# Patient Record
Sex: MALE | Race: WHITE | NOT HISPANIC OR LATINO | Employment: OTHER | URBAN - METROPOLITAN AREA
[De-identification: names, ages, dates, MRNs, and addresses within clinical notes are randomized per-mention and may not be internally consistent; named-entity substitution may affect disease eponyms.]

---

## 2017-09-20 ENCOUNTER — CLINICAL SUPPORT (OUTPATIENT)
Dept: OCCUPATIONAL MEDICINE | Facility: CLINIC | Age: 62
End: 2017-09-20

## 2017-09-20 VITALS
SYSTOLIC BLOOD PRESSURE: 131 MMHG | WEIGHT: 190 LBS | DIASTOLIC BLOOD PRESSURE: 74 MMHG | TEMPERATURE: 98 F | BODY MASS INDEX: 24.38 KG/M2 | HEIGHT: 74 IN | HEART RATE: 95 BPM | OXYGEN SATURATION: 100 %

## 2017-09-20 DIAGNOSIS — Z02.1 PRE-EMPLOYMENT EXAMINATION: Primary | ICD-10-CM

## 2017-09-20 DIAGNOSIS — Z02.89 ENCOUNTER FOR PHYSICAL EXAMINATION RELATED TO EMPLOYMENT: ICD-10-CM

## 2017-09-20 PROCEDURE — 92283 EXTND COLOR VISION XM: CPT | Mod: S$GLB,,, | Performed by: FAMILY MEDICINE

## 2017-09-20 PROCEDURE — 86580 TB INTRADERMAL TEST: CPT | Mod: S$GLB,,,

## 2017-09-20 PROCEDURE — 99173 VISUAL ACUITY SCREEN: CPT | Mod: S$GLB,,, | Performed by: FAMILY MEDICINE

## 2017-09-20 PROCEDURE — 36415 COLL VENOUS BLD VENIPUNCTURE: CPT | Mod: S$GLB,,, | Performed by: FAMILY MEDICINE

## 2017-09-20 PROCEDURE — 99000 SPECIMEN HANDLING OFFICE-LAB: CPT | Mod: S$GLB,,, | Performed by: FAMILY MEDICINE

## 2017-09-20 PROCEDURE — 99499 UNLISTED E&M SERVICE: CPT | Mod: S$GLB,,, | Performed by: FAMILY MEDICINE

## 2017-10-04 ENCOUNTER — CLINICAL SUPPORT (OUTPATIENT)
Dept: OCCUPATIONAL MEDICINE | Facility: CLINIC | Age: 62
End: 2017-10-04

## 2017-10-04 DIAGNOSIS — Z01.10 ENCOUNTER FOR HEARING TEST: Primary | ICD-10-CM

## 2017-10-04 DIAGNOSIS — Z02.83 ENCOUNTER FOR EMPLOYMENT-RELATED DRUG TESTING: ICD-10-CM

## 2017-10-04 PROCEDURE — 92552 PURE TONE AUDIOMETRY AIR: CPT | Mod: S$GLB,,, | Performed by: FAMILY MEDICINE

## 2018-10-31 ENCOUNTER — OFFICE VISIT (OUTPATIENT)
Dept: URGENT CARE | Facility: CLINIC | Age: 63
End: 2018-10-31
Payer: COMMERCIAL

## 2018-10-31 VITALS
DIASTOLIC BLOOD PRESSURE: 88 MMHG | HEART RATE: 108 BPM | WEIGHT: 190 LBS | SYSTOLIC BLOOD PRESSURE: 139 MMHG | TEMPERATURE: 99 F | BODY MASS INDEX: 24.38 KG/M2 | HEIGHT: 74 IN | OXYGEN SATURATION: 97 % | RESPIRATION RATE: 16 BRPM

## 2018-10-31 DIAGNOSIS — S39.840A PENILE FRACTURE, INITIAL ENCOUNTER: Primary | ICD-10-CM

## 2018-10-31 PROCEDURE — 99203 OFFICE O/P NEW LOW 30 MIN: CPT | Mod: S$GLB,,, | Performed by: FAMILY MEDICINE

## 2018-10-31 PROCEDURE — 3008F BODY MASS INDEX DOCD: CPT | Mod: CPTII,S$GLB,, | Performed by: FAMILY MEDICINE

## 2018-10-31 NOTE — PROGRESS NOTES
"Subjective:       Patient ID: Oc Pierce is a 63 y.o. male.    Vitals:  height is 6' 2" (1.88 m) and weight is 86.2 kg (190 lb). His temperature is 98.7 °F (37.1 °C). His blood pressure is 139/88 and his pulse is 108. His respiration is 16 and oxygen saturation is 97%.     Chief Complaint: Groin Swelling (pt said he had abnormal swelling in his penis during sex)    Pt said the swelling is still present and that there is burning sensation on the outside. He denied having any discharge.      Penis Pain   The patient's primary symptoms include penile pain. This is a new problem. The current episode started today. The problem occurs constantly. The problem has been unchanged. The pain is mild. Pertinent negatives include no chills, dysuria, fever, nausea, rash, urgency or vomiting. He has tried nothing for the symptoms. He is sexually active.   Pt reports purplish discoloration of distal penile shaft after sexual intercourse this AM.  The area is quite swollen, but not indurated.  Pt states this happened once before 5-6 months ago.  He did not seek medical attention at that time.   Pt does not remember a specific injury assoc. W/ sexual intercourse this AM  Review of Systems   Constitution: Negative for chills and fever.   Eyes: Negative for discharge.   Skin: Negative for flushing and rash.   Musculoskeletal: Negative for back pain.   Gastrointestinal: Negative for nausea and vomiting.   Genitourinary: Positive for penile pain. Negative for dysuria, genital sores, hematuria and urgency.   Psychiatric/Behavioral: The patient is nervous/anxious.        Objective:      Physical Exam   Constitutional: He is oriented to person, place, and time. He appears well-developed and well-nourished. He is cooperative.  Non-toxic appearance. He does not appear ill. No distress.   HENT:   Head: Normocephalic and atraumatic.   Right Ear: Hearing, tympanic membrane, external ear and ear canal normal.   Left Ear: Hearing, tympanic " membrane, external ear and ear canal normal.   Nose: Nose normal. No mucosal edema, rhinorrhea or nasal deformity. No epistaxis. Right sinus exhibits no maxillary sinus tenderness and no frontal sinus tenderness. Left sinus exhibits no maxillary sinus tenderness and no frontal sinus tenderness.   Mouth/Throat: Uvula is midline, oropharynx is clear and moist and mucous membranes are normal. No trismus in the jaw. Normal dentition. No uvula swelling. No posterior oropharyngeal erythema.   Eyes: Conjunctivae and lids are normal. Right eye exhibits no discharge. Left eye exhibits no discharge. No scleral icterus.   Sclera clear bilat   Neck: Trachea normal, normal range of motion, full passive range of motion without pain and phonation normal. Neck supple.   Cardiovascular: Normal rate, regular rhythm, normal heart sounds, intact distal pulses and normal pulses.   Pulmonary/Chest: Effort normal and breath sounds normal. No respiratory distress.   Abdominal: Soft. Normal appearance and bowel sounds are normal. He exhibits no distension, no pulsatile midline mass and no mass. There is no tenderness.   Genitourinary:   Genitourinary Comments: Edematous distal 1/3rd of penis sparing glans.  Penis is deviated to the right.  Large hematoma of corpus.  Mildly tender.    No drainage, induration.  Pt reports urination is normal---no blood and no discomfort or difficulty urinating.     Musculoskeletal: Normal range of motion. He exhibits no edema or deformity.   Neurological: He is alert and oriented to person, place, and time. He exhibits normal muscle tone. Coordination normal.   Skin: Skin is warm, dry and intact. He is not diaphoretic. No pallor.   Psychiatric: He has a normal mood and affect. His speech is normal and behavior is normal. Judgment and thought content normal. Cognition and memory are normal.   Nursing note and vitals reviewed.      Assessment:       1. Penile fracture, initial encounter        Plan:          Penile fracture, initial encounter  -     Ambulatory referral to Urology    Pt appears to have an acute exacerbation of a prior penile fracture.  I have started the process for an urgent Urology referral.    Apparently, when our scheduling dept spoke w/ the pt later this evening, he declined our referral and explained he planned to f/u w/ Dr. Braun promptly and would arrange this appt himself.

## 2018-10-31 NOTE — PATIENT INSTRUCTIONS
You have bleeding from the spongy portion of your penis.      This may represent a penile fracture.  Since you have had this before, I am putting in an urgent referral to Dr Braun tomorrow for you to follow-up to discuss long and short term management options.

## 2022-03-03 ENCOUNTER — OFFICE VISIT (OUTPATIENT)
Dept: URGENT CARE | Facility: CLINIC | Age: 67
End: 2022-03-03
Payer: MEDICARE

## 2022-03-03 VITALS
DIASTOLIC BLOOD PRESSURE: 101 MMHG | HEIGHT: 74 IN | WEIGHT: 190 LBS | BODY MASS INDEX: 24.38 KG/M2 | OXYGEN SATURATION: 99 % | RESPIRATION RATE: 16 BRPM | TEMPERATURE: 98 F | SYSTOLIC BLOOD PRESSURE: 164 MMHG | HEART RATE: 64 BPM

## 2022-03-03 DIAGNOSIS — S01.01XA LACERATION OF SCALP, INITIAL ENCOUNTER: Primary | ICD-10-CM

## 2022-03-03 DIAGNOSIS — R03.0 ELEVATED BLOOD PRESSURE READING: ICD-10-CM

## 2022-03-03 PROCEDURE — 90471 IMMUNIZATION ADMIN: CPT | Mod: S$GLB,,, | Performed by: FAMILY MEDICINE

## 2022-03-03 PROCEDURE — 99213 OFFICE O/P EST LOW 20 MIN: CPT | Mod: 25,S$GLB,, | Performed by: PHYSICIAN ASSISTANT

## 2022-03-03 PROCEDURE — 90715 TDAP VACCINE 7 YRS/> IM: CPT | Mod: S$GLB,,, | Performed by: FAMILY MEDICINE

## 2022-03-03 PROCEDURE — 90471 TDAP VACCINE GREATER THAN OR EQUAL TO 7YO IM: ICD-10-PCS | Mod: S$GLB,,, | Performed by: FAMILY MEDICINE

## 2022-03-03 PROCEDURE — 90715 TDAP VACCINE GREATER THAN OR EQUAL TO 7YO IM: ICD-10-PCS | Mod: S$GLB,,, | Performed by: FAMILY MEDICINE

## 2022-03-03 PROCEDURE — 12001 LACERATION REPAIR: ICD-10-PCS | Mod: S$GLB,,, | Performed by: PHYSICIAN ASSISTANT

## 2022-03-03 PROCEDURE — 12001 RPR S/N/AX/GEN/TRNK 2.5CM/<: CPT | Mod: S$GLB,,, | Performed by: PHYSICIAN ASSISTANT

## 2022-03-03 PROCEDURE — 99213 PR OFFICE/OUTPT VISIT, EST, LEVL III, 20-29 MIN: ICD-10-PCS | Mod: 25,S$GLB,, | Performed by: PHYSICIAN ASSISTANT

## 2022-03-03 RX ORDER — MUPIROCIN 20 MG/G
OINTMENT TOPICAL
Qty: 30 G | Refills: 0 | Status: SHIPPED | OUTPATIENT
Start: 2022-03-03

## 2022-03-03 NOTE — PATIENT INSTRUCTIONS
Follow up with your primary care if symptoms do not improve, or you may return here at any time.  If you were referred to a specialist, please follow up with that specialty.  If you were prescribed antibiotics, please take them to completion.  If you were prescribed a narcotic or any medication with sedative effects, do not drive or operate heavy equipment or machinery while taking these medications.  You must understand that you have received treatment at an Urgent Care facility only, and that you may be released before all of your medical problems are known or treated. Urgent Care facilities are not equipped to handle life threatening emergencies. It is recommended that you seek care at an Emergency Department for further evaluation of worsening or concerning symptoms, or possibly life threatening conditions as discussed.                                        If you  smoke, please stop smoking        Elevated Blood Pressure  Your blood pressure was elevated during your visit to the urgent care. It is recommended that you monitor your blood pressure over the next week or two to make sure that it is not consistently elevated.  Please have your blood pressure taken 2-3 times daily at different times of the day.  Write all of those blood pressures down and record the time that they were taken.  Keep all that information and take it with you to see your Primary Care Physician.  If your blood pressure is consistently above 140/90 you will need to follow up with your PCP more quickly. If you develop chest pain, severe headache, extremity weakness, trouble speaking, facial droop, or visual disturbance seek care in an Emergency Room immediately.

## 2022-03-03 NOTE — PROCEDURES
Laceration Repair    Date/Time: 3/3/2022 5:15 PM  Performed by: Apolinar Card PA-C  Authorized by: Apolinar Card PA-C   Consent Done: Yes  Consent: Verbal consent obtained.  Risks and benefits: risks, benefits and alternatives were discussed  Consent given by: patient  Patient understanding: patient states understanding of the procedure being performed  Patient consent: the patient's understanding of the procedure matches consent given  Procedure consent: procedure consent matches procedure scheduled  Relevant documents: relevant documents present and verified  Patient identity confirmed: , MRN, name, verbally with patient and provided demographic data  Body area: head/neck  Location details: scalp  Laceration length: 1 cm  Foreign bodies: unknown  Tendon involvement: none  Nerve involvement: none  Vascular damage: no  Anesthesia method: none.    Patient sedated: no  Preparation: Patient was prepped and draped in the usual sterile fashion.  Irrigation solution: saline  Amount of cleaning: standard  Debridement: none  Degree of undermining: none  Skin closure: staples  Number of sutures: 2  Technique: simple  Approximation: close  Approximation difficulty: simple  Dressing: antibiotic ointment  Patient tolerance: Patient tolerated the procedure well with no immediate complications

## 2022-03-03 NOTE — PROGRESS NOTES
"Subjective:       Patient ID: Oc Pierce is a 67 y.o. male.    Vitals:  height is 6' 2" (1.88 m) and weight is 86.2 kg (190 lb). His temperature is 97.8 °F (36.6 °C). His blood pressure is 164/101 (abnormal) and his pulse is 64. His respiration is 16 and oxygen saturation is 99%.     Chief Complaint: Laceration    Pt presents to the clinic today with a aceration to right scalp. Incident occurred about 1 hour ago. Metal decorative post topper fell off and struck him in the head. He denies LOC, severe headache, nausea/vominting, visual disturbances. He is not on blood thinners. He is unsure of his last tetanus shot date.    Laceration   The incident occurred 1 to 3 hours ago. The laceration is 1 cm in size. The laceration mechanism was a metal edge. The pain is at a severity of 4/10. The pain is mild. The pain has been constant since onset. He reports no foreign bodies present.       Constitution: Negative for chills, sweating, fatigue and fever.   Neck: Negative for neck stiffness.   Skin: Positive for laceration.   Neurological: Negative for dizziness, headaches, loss of consciousness, numbness and tingling.       Objective:      Physical Exam   Constitutional: He is oriented to person, place, and time. He appears well-developed.  Non-toxic appearance. He does not appear ill. No distress.   HENT:   Head: Normocephalic and atraumatic. Head is without raccoon's eyes and without Coto's sign.       Ears:   Right Ear: Hearing and external ear normal.   Left Ear: Hearing and external ear normal.   Eyes: Conjunctivae and EOM are normal. Pupils are equal, round, and reactive to light. Right eye exhibits normal extraocular motion. Left eye exhibits normal extraocular motion.   Neck: Neck supple. No neck rigidity present.   Cardiovascular: Normal rate and regular rhythm.   Pulmonary/Chest: Effort normal and breath sounds normal. No respiratory distress.   Neurological: He is alert and oriented to person, place, and " time.   Skin: Skin is warm, dry and not diaphoretic.   Psychiatric: His speech is normal and behavior is normal. Mood normal.   Nursing note and vitals reviewed.        Assessment:       1. Laceration of scalp, initial encounter    2. Elevated blood pressure reading          Plan:       Past medical hx, family hx, social hx, and current medications were provided by the patient and were reviewed. Dx and tx were discussed with the patient. Return to [facility] for any concern. Follow up with PCP for any persistence of problem, or worsening. ER precautions. Pt verbalized understanding of all discussed, and agreed.    Laceration of scalp, initial encounter  -     mupirocin (BACTROBAN) 2 % ointment; Apply to affected area 2 times daily  Dispense: 30 g; Refill: 0  -     Laceration Repair    Elevated blood pressure reading    Other orders  -     (In Office Administered) Tdap Vaccine      Patient Instructions   · Follow up with your primary care if symptoms do not improve, or you may return here at any time.  · If you were referred to a specialist, please follow up with that specialty.  · If you were prescribed antibiotics, please take them to completion.  · If you were prescribed a narcotic or any medication with sedative effects, do not drive or operate heavy equipment or machinery while taking these medications.  · You must understand that you have received treatment at an Urgent Care facility only, and that you may be released before all of your medical problems are known or treated. Urgent Care facilities are not equipped to handle life threatening emergencies. It is recommended that you seek care at an Emergency Department for further evaluation of worsening or concerning symptoms, or possibly life threatening conditions as discussed.                                        If you  smoke, please stop smoking        Elevated Blood Pressure  Your blood pressure was elevated during your visit to the urgent care. It is  recommended that you monitor your blood pressure over the next week or two to make sure that it is not consistently elevated.  Please have your blood pressure taken 2-3 times daily at different times of the day.  Write all of those blood pressures down and record the time that they were taken.  Keep all that information and take it with you to see your Primary Care Physician.  If your blood pressure is consistently above 140/90 you will need to follow up with your PCP more quickly. If you develop chest pain, severe headache, extremity weakness, trouble speaking, facial droop, or visual disturbance seek care in an Emergency Room immediately.

## 2022-03-13 ENCOUNTER — CLINICAL SUPPORT (OUTPATIENT)
Dept: URGENT CARE | Facility: CLINIC | Age: 67
End: 2022-03-13
Payer: MEDICARE

## 2022-03-13 VITALS
HEIGHT: 74 IN | BODY MASS INDEX: 24.38 KG/M2 | SYSTOLIC BLOOD PRESSURE: 159 MMHG | TEMPERATURE: 97 F | HEART RATE: 100 BPM | DIASTOLIC BLOOD PRESSURE: 93 MMHG | OXYGEN SATURATION: 99 % | WEIGHT: 190 LBS | RESPIRATION RATE: 17 BRPM

## 2022-03-13 DIAGNOSIS — Z48.02 ENCOUNTER FOR STAPLE REMOVAL: Primary | ICD-10-CM

## 2022-03-13 PROCEDURE — 99499 UNLISTED E&M SERVICE: CPT | Mod: S$GLB,,, | Performed by: PHYSICIAN ASSISTANT

## 2022-03-13 PROCEDURE — 99499 NO LOS: ICD-10-PCS | Mod: S$GLB,,, | Performed by: PHYSICIAN ASSISTANT

## 2022-03-13 NOTE — PROGRESS NOTES
"Subjective:       Patient ID: Oc Pierce is a 67 y.o. male.    Vitals:  height is 6' 2" (1.88 m) and weight is 86.2 kg (190 lb). His temperature is 97.4 °F (36.3 °C). His blood pressure is 159/93 (abnormal) and his pulse is 100. His respiration is 17 and oxygen saturation is 99%.     Chief Complaint: Suture / Staple Removal    Patient presents to clinic to have staples removed from head. Patient states he was advised to return to clinic in 10 days.  No drainage, no pain, no swelling.    Suture / Staple Removal  The sutures were placed 7 to 10 days ago. He tried nothing since the wound repair. His temperature was unmeasured prior to arrival. There has been no drainage from the wound. There is no redness present. There is no swelling present. There is no pain present.     ROS    Objective:      Physical Exam   Skin:         Comments: Two staples in place to right posterior scalp.  Wound appears to be well healed without surrounding erythema.         Assessment:       1. Encounter for staple removal          Plan:         Encounter for staple removal    Both staples removed without incident.  Patient tolerated well.               "

## 2022-07-21 ENCOUNTER — TELEPHONE (OUTPATIENT)
Dept: URGENT CARE | Facility: CLINIC | Age: 67
End: 2022-07-21
Payer: MEDICARE

## 2022-11-27 ENCOUNTER — OFFICE VISIT (OUTPATIENT)
Dept: URGENT CARE | Facility: CLINIC | Age: 67
End: 2022-11-27
Payer: MEDICARE

## 2022-11-27 VITALS
TEMPERATURE: 99 F | OXYGEN SATURATION: 98 % | RESPIRATION RATE: 16 BRPM | DIASTOLIC BLOOD PRESSURE: 90 MMHG | WEIGHT: 190 LBS | SYSTOLIC BLOOD PRESSURE: 147 MMHG | HEIGHT: 74 IN | BODY MASS INDEX: 24.38 KG/M2 | HEART RATE: 99 BPM

## 2022-11-27 DIAGNOSIS — R05.9 COUGH, UNSPECIFIED TYPE: ICD-10-CM

## 2022-11-27 DIAGNOSIS — U07.1 COVID-19 VIRUS DETECTED: ICD-10-CM

## 2022-11-27 DIAGNOSIS — U07.1 COVID-19 VIRUS INFECTION: Primary | ICD-10-CM

## 2022-11-27 LAB
CTP QC/QA: YES
CTP QC/QA: YES
POC MOLECULAR INFLUENZA A AGN: NEGATIVE
POC MOLECULAR INFLUENZA B AGN: NEGATIVE
SARS-COV-2 AG RESP QL IA.RAPID: POSITIVE

## 2022-11-27 PROCEDURE — 87811 SARS CORONAVIRUS 2 ANTIGEN POCT, MANUAL READ: ICD-10-PCS | Mod: QW,CR,S$GLB, | Performed by: NURSE PRACTITIONER

## 2022-11-27 PROCEDURE — 99213 PR OFFICE/OUTPT VISIT, EST, LEVL III, 20-29 MIN: ICD-10-PCS | Mod: CS,S$GLB,, | Performed by: NURSE PRACTITIONER

## 2022-11-27 PROCEDURE — 87502 POCT INFLUENZA A/B MOLECULAR: ICD-10-PCS | Mod: QW,S$GLB,, | Performed by: NURSE PRACTITIONER

## 2022-11-27 PROCEDURE — 87502 INFLUENZA DNA AMP PROBE: CPT | Mod: QW,S$GLB,, | Performed by: NURSE PRACTITIONER

## 2022-11-27 PROCEDURE — 87811 SARS-COV-2 COVID19 W/OPTIC: CPT | Mod: QW,CR,S$GLB, | Performed by: NURSE PRACTITIONER

## 2022-11-27 PROCEDURE — 99213 OFFICE O/P EST LOW 20 MIN: CPT | Mod: CS,S$GLB,, | Performed by: NURSE PRACTITIONER

## 2022-11-27 RX ORDER — ALBUTEROL SULFATE 90 UG/1
2 AEROSOL, METERED RESPIRATORY (INHALATION) EVERY 6 HOURS PRN
Qty: 6.7 G | Refills: 0 | Status: SHIPPED | OUTPATIENT
Start: 2022-11-27 | End: 2024-02-18 | Stop reason: CLARIF

## 2022-11-27 RX ORDER — BENZONATATE 200 MG/1
200 CAPSULE ORAL 3 TIMES DAILY PRN
Qty: 30 CAPSULE | Refills: 0 | Status: SHIPPED | OUTPATIENT
Start: 2022-11-27

## 2022-11-27 RX ORDER — FLUTICASONE PROPIONATE 50 MCG
1 SPRAY, SUSPENSION (ML) NASAL DAILY PRN
Qty: 16 G | Refills: 0 | Status: SHIPPED | OUTPATIENT
Start: 2022-11-27

## 2022-11-27 NOTE — PROGRESS NOTES
"Subjective:       Patient ID: Oc Pierce is a 67 y.o. male.    Vitals:  height is 6' 2.02" (1.88 m) and weight is 86.2 kg (190 lb). His oral temperature is 99.4 °F (37.4 °C). His blood pressure is 147/90 (abnormal) and his pulse is 99. His respiration is 16 and oxygen saturation is 98%.     Chief Complaint: Nasal Congestion and Cough    Patient is present in the clinic with congestion and cough. Patient symptoms started on yesterday. Patient is currently taking OTC medication.    Provider note begins below:  Patient denies fever, cp, sob, vomiting or abdominal pain. Awake and alert. No acute distress. No sick contacts.    Cough  This is a new problem. The current episode started yesterday. The problem has been unchanged. The problem occurs constantly. Associated symptoms include chills, a fever, headaches, nasal congestion and postnasal drip. Pertinent negatives include no chest pain, ear congestion, ear pain, eye redness, heartburn, hemoptysis, myalgias, rash, rhinorrhea, sore throat, shortness of breath, sweats, weight loss or wheezing. Nothing aggravates the symptoms. He has tried nothing for the symptoms. The treatment provided mild relief. There is no history of asthma, bronchiectasis, bronchitis, COPD, emphysema or environmental allergies.     Constitution: Positive for chills and fever. Negative for fatigue.   HENT:  Positive for postnasal drip. Negative for ear pain, ear discharge, facial swelling, sinus pressure and sore throat.    Neck: Negative for neck pain, neck stiffness and painful lymph nodes.   Cardiovascular: Negative.  Negative for chest pain and sob on exertion.   Eyes: Negative.  Negative for eye itching, eye pain and eye redness.   Respiratory:  Positive for cough. Negative for chest tightness, bloody sputum, shortness of breath, wheezing and asthma.    Gastrointestinal: Negative.  Negative for abdominal pain, nausea, vomiting and heartburn.   Endocrine: negative. excessive thirst. "   Genitourinary: Negative.  Negative for dysuria, frequency, urgency and flank pain.   Musculoskeletal: Negative.  Negative for pain, trauma, joint pain, joint swelling and muscle ache.   Skin: Negative.  Negative for rash, wound, lesion and hives.   Allergic/Immunologic: Negative.  Negative for environmental allergies, eczema, asthma, hives, itching and sneezing.   Neurological:  Positive for headaches. Negative for dizziness, passing out, disorientation and altered mental status.   Hematologic/Lymphatic: Negative.  Negative for swollen lymph nodes.   Psychiatric/Behavioral: Negative.  Negative for altered mental status, disorientation and confusion.      Objective:      Physical Exam   Constitutional: He is oriented to person, place, and time. He appears well-developed. He is cooperative.  Non-toxic appearance. He does not appear ill. No distress.   HENT:   Head: Normocephalic and atraumatic.   Ears:   Right Ear: Hearing, tympanic membrane, external ear and ear canal normal. impacted cerumen  Left Ear: Hearing, tympanic membrane, external ear and ear canal normal. impacted cerumen  Nose: Nose normal. No mucosal edema, rhinorrhea, nasal deformity or congestion. No epistaxis. Right sinus exhibits no maxillary sinus tenderness and no frontal sinus tenderness. Left sinus exhibits no maxillary sinus tenderness and no frontal sinus tenderness.   Mouth/Throat: Uvula is midline, oropharynx is clear and moist and mucous membranes are normal. No trismus in the jaw. Normal dentition. No uvula swelling. No oropharyngeal exudate, posterior oropharyngeal edema or posterior oropharyngeal erythema.   Eyes: Conjunctivae and lids are normal. No scleral icterus.   Neck: Trachea normal and phonation normal. Neck supple. No edema present. No erythema present. No neck rigidity present.   Cardiovascular: Normal rate, regular rhythm, normal heart sounds and normal pulses.   Pulmonary/Chest: Effort normal and breath sounds normal. No  stridor. No respiratory distress. He has no decreased breath sounds. He has no wheezes. He has no rhonchi. He has no rales. He exhibits no tenderness.   Abdominal: Normal appearance. He exhibits no distension. Soft. flat abdomen There is no abdominal tenderness. There is no guarding, no left CVA tenderness and no right CVA tenderness.   Musculoskeletal: Normal range of motion.         General: No deformity. Normal range of motion.   Lymphadenopathy:     He has no cervical adenopathy.   Neurological: no focal deficit. He is alert and oriented to person, place, and time. He exhibits normal muscle tone. Coordination normal.   Skin: Skin is warm, dry, intact, not diaphoretic and not pale.   Psychiatric: His speech is normal and behavior is normal. Mood, judgment and thought content normal.   Nursing note and vitals reviewed.  The following results have been reviewed with the patient:  LABS-  Results for orders placed or performed in visit on 11/27/22   POCT Influenza A/B MOLECULAR   Result Value Ref Range    POC Molecular Influenza A Ag Negative Negative, Not Reported    POC Molecular Influenza B Ag Negative Negative, Not Reported     Acceptable Yes    SARS Coronavirus 2 Antigen, POCT Manual Read   Result Value Ref Range    SARS Coronavirus 2 Antigen Positive (A) Negative     Acceptable Yes         IMAGING-  No results found.        Assessment:       1. COVID-19 virus infection    2. Cough, unspecified type          Plan:       All hx was provided by the pt or available as part of established EMR. The pt past medical hx, family hx, social hx, and current medications were reviewed. Interpretation of diagnostics performed today were discussed. Tx discussed. Quarantine recommendations based on CDC guidelines discussed. Pt may follow up with [facility] for any concern. ER precautions. Pt voiced understanding of all discussed, and agreed.   FOLLOWUP  Follow up if symptoms worsen or fail to improve, for  PLEASE CONTACT PCP OR CONTACT THE EMERGENCY ROOM..     PATIENT INSTRUCTIONS  Patient Instructions   INSTRUCTIONS:  - Rest.  - Drink plenty of fluids.  - Take Tylenol and/or Ibuprofen as directed as needed for fever/pain.  Do not take more than the recommended dose.  - follow up with your PCP within the next 1-2 weeks as needed.  - You must understand that you have received an Urgent Care treatment only and that you may be released before all of your medical problems are known or treated.   - You, the patient, will arrange for follow up care as instructed.   - If your condition worsens or fails to improve we recommend that you receive another evaluation at the ER immediately or contact your PCP to discuss your concerns.   - You can call [phone] or [phone] to help schedule an appointment with the appropriate provider.     -If you smoke cigarettes, it would be beneficial for you to stop.    OVER THE COUNTER RECOMMENDATIONS/SUGGESTIONS.     Make sure to stay well hydrated.     Use Nasal Saline to mechanically move any post nasal drip from your eustachian tube or from the back of your throat.     Use warm salt water gargles to ease your throat pain. Warm salt water gargles as needed for sore throat-  1/2 tsp salt to 1 cup warm water, gargle as desired.     Use an antihistamine such as Claritin, Zyrtec or Allegra to dry you out.      Use pseudoephedrine (behind the counter) to decongest. Pseudoephedrine  30 mg up to 240 mg /day. It can raise your blood pressure and give you palpitations.     Use mucinex (guaifenisin) to break up mucous up to 2400mg/day to loosen any mucous.   The mucinex DM pill has a cough suppressant that can be sedating. It can be used at night to stop the tickle at the back of your throat.  You can use Mucinex D (it has guaifenesin and a high dose of pseudoephedrine) in the mornings to help decongest.        Use Afrin in each nare for no longer than 3 days, as it is addictive. It can  also dry out your mucous membranes and cause elevated blood pressure. This is especially useful if you are flying.     Use Flonase 1-2 sprays/nostril per day. It is a local acting steroid nasal spray, if you develop a bloody nose, stop using the medication immediately.     Sometimes Nyquil at night is beneficial to help you get some rest, however it is sedating and it does have an antihistamine, and tylenol.     Honey is a natural cough suppressant that can be used.     Tylenol up to 4,000 mg a day is safe for short periods and can be used for body aches, pain, and fever. However in high doses and prolonged use it can cause liver irritation.     Ibuprofen is a non-steroidal anti-inflammatory that can be used for body aches, pain, and fever.However it can also cause stomach irritation if over used.         THANK YOU FOR ALLOWING ME TO PARTICIPATE IN YOUR HEALTHCARE,     Christian López, NP     COVID-19 virus infection  -     albuterol (VENTOLIN HFA) 90 mcg/actuation inhaler; Inhale 2 puffs into the lungs every 6 (six) hours as needed for Wheezing or Shortness of Breath. Rescue  Dispense: 6.7 g; Refill: 0  -     benzonatate (TESSALON) 200 MG capsule; Take 1 capsule (200 mg total) by mouth 3 (three) times daily as needed for Cough.  Dispense: 30 capsule; Refill: 0  -     fluticasone propionate (FLONASE) 50 mcg/actuation nasal spray; 1 spray (50 mcg total) by Each Nostril route daily as needed for Rhinitis.  Dispense: 16 g; Refill: 0    Cough, unspecified type  -     POCT Influenza A/B MOLECULAR  -     SARS Coronavirus 2 Antigen, POCT Manual Read

## 2022-11-27 NOTE — PATIENT INSTRUCTIONS
INSTRUCTIONS:  - Rest.  - Drink plenty of fluids.  - Take Tylenol and/or Ibuprofen as directed as needed for fever/pain.  Do not take more than the recommended dose.  - follow up with your PCP within the next 1-2 weeks as needed.  - You must understand that you have received an Urgent Care treatment only and that you may be released before all of your medical problems are known or treated.   - You, the patient, will arrange for follow up care as instructed.   - If your condition worsens or fails to improve we recommend that you receive another evaluation at the ER immediately or contact your PCP to discuss your concerns.   - You can call [phone] or [phone] to help schedule an appointment with the appropriate provider.     -If you smoke cigarettes, it would be beneficial for you to stop.    OVER THE COUNTER RECOMMENDATIONS/SUGGESTIONS.     Make sure to stay well hydrated.     Use Nasal Saline to mechanically move any post nasal drip from your eustachian tube or from the back of your throat.     Use warm salt water gargles to ease your throat pain. Warm salt water gargles as needed for sore throat-  1/2 tsp salt to 1 cup warm water, gargle as desired.     Use an antihistamine such as Claritin, Zyrtec or Allegra to dry you out.      Use pseudoephedrine (behind the counter) to decongest. Pseudoephedrine  30 mg up to 240 mg /day. It can raise your blood pressure and give you palpitations.     Use mucinex (guaifenisin) to break up mucous up to 2400mg/day to loosen any mucous.   The mucinex DM pill has a cough suppressant that can be sedating. It can be used at night to stop the tickle at the back of your throat.  You can use Mucinex D (it has guaifenesin and a high dose of pseudoephedrine) in the mornings to help decongest.        Use Afrin in each nare for no longer than 3 days, as it is addictive. It can also dry out your mucous membranes and cause elevated blood pressure. This is especially useful if you  are flying.     Use Flonase 1-2 sprays/nostril per day. It is a local acting steroid nasal spray, if you develop a bloody nose, stop using the medication immediately.     Sometimes Nyquil at night is beneficial to help you get some rest, however it is sedating and it does have an antihistamine, and tylenol.     Honey is a natural cough suppressant that can be used.     Tylenol up to 4,000 mg a day is safe for short periods and can be used for body aches, pain, and fever. However in high doses and prolonged use it can cause liver irritation.     Ibuprofen is a non-steroidal anti-inflammatory that can be used for body aches, pain, and fever.However it can also cause stomach irritation if over used.